# Patient Record
Sex: FEMALE | Race: WHITE
[De-identification: names, ages, dates, MRNs, and addresses within clinical notes are randomized per-mention and may not be internally consistent; named-entity substitution may affect disease eponyms.]

---

## 2020-05-27 NOTE — PCM.PREANE
<Rajwinder Martin - Last Filed: 05/28/20 08:45>





Preanesthetic Assessment





- Procedure


Proposed Procedure: 





Screening Colonoscopy





- Anesthesia/Transfusion/Family Hx


Anesthesia History: Prior Anesthesia Without Reaction


Family History of Anesthesia Reaction: No


Transfusion History: No Prior Transfusion(s)


Intubation History: Unknown





- Review of Systems


General: No Symptoms


Pulmonary: No Symptoms (Former smoker: less than 1ppd times 30 years. Quite in 

2013. )


Cardiovascular: No Symptoms


Gastrointestinal: No Symptoms


Neurological: No Symptoms


Other: Reports: Sinus Problem (allergic rhinitis no problems today)





- Physical Assessment


Vital Signs: 





 Last Vital Signs











Temp  36.3 C   05/28/20 07:45


 


Pulse  61   05/28/20 07:45


 


Resp  16   05/28/20 07:45


 


BP  147/78 H  05/28/20 07:45


 


Pulse Ox  96   05/28/20 07:45











Weight: 77 kg


ASA Class: 2


Mental Status: Alert & Oriented x3


Airway Class: Mallampati = 2


Dentition: Reports: Crown(s) (Several, slightly loose on her back top. Regular 

dental care. )


Thyro-Mental Finger Breadths: 2


Mouth Opening Finger Breadths: 3


ROM/Head Extension: Full


Lungs: Clear to Auscultation, Normal Respiratory Effort


Cardiovascular: Regular Rate, Regular Rhythm





- Allergies


Allergies/Adverse Reactions: 


 Allergies











Allergy/AdvReac Type Severity Reaction Status Date / Time


 


No Known Allergies Allergy   Verified 05/27/20 12:43














- Anesthesia Plan


Pre-Op Medication Ordered: Beta Blocker


Beta Blocker: Other


Med Last Dose Time: 06:15





- Acknowledgements


Anesthesia Type Planned: MAC


Pt an Appropriate Candidate for the Planned Anesthesia: Yes


Alternatives and Risks of Anesthesia Discussed w Pt/Guardian: Yes


Pt/Guardian Understands and Agrees with Anesthesia Plan: Yes





PreAnesthesia Questionnaire





- HOME MEDS


Home Medications: 


 Home Meds





Ascorbic Acid [Vitamin C] 500 mg PO DAILY 05/27/20 [History]


Bisoprolol/Hydrochlorothiazide [Bisoprolol/HCTZ 5-6.25 MG] 1 tab PO DAILY 05/27/ 20 [History]


Calcium Carb/Vitamin D3/Vit K1 [Viactiv 650 mg-12.5 Mcg Chew] 2 tab PO DAILY 05/ 27/20 [History]


Cholecalciferol (Vitamin D3) [Vitamin D3] 1,000 unit PO DAILY 05/27/20 [History]


Multivitamin [Multiple Vitamins] 1 tab PO DAILY 05/27/20 [History]


Niacin 500 mg PO DAILY 05/27/20 [History]


Rosuvastatin [Crestor] 10 mg PO DAILY 05/27/20 [History]


amLODIPine [Norvasc] 5 mg PO DAILY 05/27/20 [History]











- CURRENT (IN HOUSE) MEDS


Current Meds: 





 Current Medications





Lactated Ringer's (Ringers, Lactated)  1,000 mls @ 125 mls/hr IV ASDIRECTED DIMPLE


   Stop: 05/28/20 23:00


   Last Admin: 05/28/20 08:00 Dose:  125 mls/hr


Lidocaine/Sodium Bicarbonate (Buffered Lidocaine 1% In Ns 8.4%)  0.25 ml IDERM 

ONETIME PRN


   PRN Reason: Prior to IV Start


   Stop: 05/28/20 23:00


   Last Admin: 05/28/20 07:59 Dose:  0.25 ml


Sodium Chloride (Saline Flush)  10 ml FLUSH ASDIRECTED PRN


   PRN Reason: Keep Vein Open


   Stop: 05/28/20 23:00





Discontinued Medications





Fentanyl (Sublimaze) Confirm Administered Dose 100 mcg .ROUTE .STK-MED ONE


   Stop: 05/28/20 07:08


Lidocaine HCl (Xylocaine-Mpf 1%) Confirm Administered Dose 6 mls @ as directed 

.ROUTE .STK-MED ONE


   Stop: 05/28/20 07:08


Propofol (Diprivan  20 Ml) Confirm Administered Dose 200 mg .ROUTE .STK-MED ONE


   Stop: 05/28/20 07:08











<Ivory Hay - Last Filed: 05/28/20 09:05>





Preanesthetic Assessment





- Anesthesia/Transfusion/Family Hx


Anesthesia History: Prior Anesthesia Without Reaction


Family History of Anesthesia Reaction: No


Transfusion History: No Prior Transfusion(s)


Intubation History: Unknown





- Review of Systems


Pulmonary: No Symptoms (Former smoker: less than 1ppd times 30 years.)


Cardiovascular: No Symptoms (HTN, elevated cholesterol)


Other: Reports: Sinus Problem (allergic rhinitis)





- Physical Assessment


NPO Status Date: 05/28/20


NPO Status Time: 04:00


Vital Signs: 





 Last Vital Signs











Temp  36.3 C   05/28/20 07:45


 


Pulse  61   05/28/20 07:45


 


Resp  16   05/28/20 07:45


 


BP  147/78 H  05/28/20 07:45


 


Pulse Ox  96   05/28/20 07:45











HR:


B/P:


Sat:


Temp:


Resp:


Height: 1.63 m


ASA Class: 2


Mental Status: Alert & Oriented x3





- Lab


Values: 





All labs reviewed and noted and within acceptable ranges to proceed with 

scheduled procedure.





- Anesthesia Plan


Pre-Op Medication Ordered: Beta Blocker


Beta Blocker: Other (Bisoprolol)


Med Last Dose Date: 05/28/20





- Acknowledgements


Anesthesia Type Planned: MAC


Pt an Appropriate Candidate for the Planned Anesthesia: Yes


Alternatives and Risks of Anesthesia Discussed w Pt/Guardian: Yes


Pt/Guardian Understands and Agrees with Anesthesia Plan: Yes

## 2020-05-28 ENCOUNTER — HOSPITAL ENCOUNTER (OUTPATIENT)
Dept: HOSPITAL 41 - JD.SDS | Age: 60
Discharge: HOME | End: 2020-05-28
Attending: SURGERY
Payer: COMMERCIAL

## 2020-05-28 DIAGNOSIS — K57.30: ICD-10-CM

## 2020-05-28 DIAGNOSIS — Z87.891: ICD-10-CM

## 2020-05-28 DIAGNOSIS — E78.00: ICD-10-CM

## 2020-05-28 DIAGNOSIS — I10: ICD-10-CM

## 2020-05-28 DIAGNOSIS — Z79.899: ICD-10-CM

## 2020-05-28 DIAGNOSIS — E78.5: ICD-10-CM

## 2020-05-28 DIAGNOSIS — Z12.11: Primary | ICD-10-CM

## 2020-05-28 PROCEDURE — 45378 DIAGNOSTIC COLONOSCOPY: CPT

## 2020-05-28 NOTE — PCM.PRNOTE
- Free Text/Narrative


Note: 





Date: 5/28/2020


Procedure: screening colonoscopy





Findings: extensive diverticulosis. Prep was excellent, no polyps identified. 





Detailed Report:





The patient was taken to the endoscopy wuite and placed in left lateral 

decubitus position. Monitored anesthesia care was initiated and time out 

performed. There was a small anterior anal skin tag. Digitial rectal exam was 

remarkable only for a few palpable hypertrophied anal papillae. The colonoscope 

was inserted and advanced to the cecum. The ileocecal valve and appendiceal 

orifice were visualized. Prep was excellent. The scope was slowly withdrawn and 

mucosal surfaces carefully inspected. There was diverticular disease throughout

, most notably in the sigmoid colon. No polyps were identified. No significant 

hemorrhoidal disease on retroflexion of the scope in the rectum; the papillae 

were visualized. Air was evacuated and the scope removed. The patient tolerated 

the procedure well. 





Carlos Ny MD


General Surgery

## 2020-05-28 NOTE — [id number]
Post Anesthesia Note





- EVALUATION WITHIN 48HRS OF ANESTHETIC


Vital Signs in Normal Range: Yes


Patient Participated in Evaluation: Yes


Respiratory Function Stable: Yes


Airway Patent: Yes


Cardiovascular Function Stable: Yes


Hydration Status Stable: Yes


Pain Control Satisfactory: Yes


Nausea and Vomiting Control Satisfactory: Yes


Mental Status Recovered: Yes


Vital Signs: 


 Last Vital Signs











Temp  36.3 C   05/28/20 07:45


 


Pulse  61   05/28/20 07:45


 


Resp  16   05/28/20 07:45


 


BP  147/78 H  05/28/20 07:45


 


Pulse Ox  96   05/28/20 07:45